# Patient Record
Sex: FEMALE | Race: WHITE | NOT HISPANIC OR LATINO | URBAN - METROPOLITAN AREA
[De-identification: names, ages, dates, MRNs, and addresses within clinical notes are randomized per-mention and may not be internally consistent; named-entity substitution may affect disease eponyms.]

---

## 2017-06-02 ENCOUNTER — OUTPATIENT (OUTPATIENT)
Dept: OUTPATIENT SERVICES | Facility: HOSPITAL | Age: 77
LOS: 1 days | End: 2017-06-02

## 2017-06-02 VITALS
TEMPERATURE: 98 F | DIASTOLIC BLOOD PRESSURE: 86 MMHG | HEIGHT: 66.5 IN | SYSTOLIC BLOOD PRESSURE: 140 MMHG | RESPIRATION RATE: 16 BRPM | WEIGHT: 173.06 LBS | HEART RATE: 84 BPM

## 2017-06-02 DIAGNOSIS — N81.4 UTEROVAGINAL PROLAPSE, UNSPECIFIED: ICD-10-CM

## 2017-06-02 DIAGNOSIS — N84.0 POLYP OF CORPUS UTERI: ICD-10-CM

## 2017-06-02 DIAGNOSIS — I10 ESSENTIAL (PRIMARY) HYPERTENSION: ICD-10-CM

## 2017-06-02 DIAGNOSIS — Z90.89 ACQUIRED ABSENCE OF OTHER ORGANS: Chronic | ICD-10-CM

## 2017-06-02 NOTE — H&P PST ADULT - FAMILY HISTORY
<<-----Click on this checkbox to enter Family History Family history of colon cancer     Mother  Still living? No  Family history of hypertension, Age at diagnosis: Age Unknown     Father  Still living? No  Family history of stroke, Age at diagnosis: Age Unknown

## 2017-06-02 NOTE — H&P PST ADULT - PROBLEM SELECTOR PLAN 1
scheduled d&c hysteroscopy, polypectomy on 6/12/17.  preop instructions, gi prophylaxis given  pt verbalized understanding  copy of EKG, cbc, bmp in chart from pcp

## 2017-06-02 NOTE — H&P PST ADULT - HISTORY OF PRESENT ILLNESS
76y/o female presents for preop eval for scheduled d&c hysteroscopy, polypectomy on 6/12/17.  Pt with c/o recent vaginal staining.  Pt with h/o uterine prolapse & HTN.

## 2017-06-02 NOTE — H&P PST ADULT - NEGATIVE CARDIOVASCULAR SYMPTOMS
no claudication/no dyspnea on exertion/no chest pain/no paroxysmal nocturnal dyspnea/no orthopnea/no palpitations/no peripheral edema

## 2017-06-11 ENCOUNTER — TRANSCRIPTION ENCOUNTER (OUTPATIENT)
Age: 77
End: 2017-06-11

## 2017-06-12 ENCOUNTER — OUTPATIENT (OUTPATIENT)
Dept: OUTPATIENT SERVICES | Facility: HOSPITAL | Age: 77
LOS: 1 days | Discharge: HOME | End: 2017-06-12
Payer: MEDICARE

## 2017-06-12 VITALS
SYSTOLIC BLOOD PRESSURE: 151 MMHG | RESPIRATION RATE: 14 BRPM | HEIGHT: 66.5 IN | DIASTOLIC BLOOD PRESSURE: 81 MMHG | WEIGHT: 169.98 LBS | HEART RATE: 90 BPM | OXYGEN SATURATION: 100 % | TEMPERATURE: 98 F

## 2017-06-12 VITALS
DIASTOLIC BLOOD PRESSURE: 72 MMHG | RESPIRATION RATE: 16 BRPM | HEART RATE: 74 BPM | SYSTOLIC BLOOD PRESSURE: 121 MMHG | OXYGEN SATURATION: 97 % | TEMPERATURE: 97 F

## 2017-06-12 DIAGNOSIS — Z90.89 ACQUIRED ABSENCE OF OTHER ORGANS: Chronic | ICD-10-CM

## 2017-06-12 DIAGNOSIS — N84.0 POLYP OF CORPUS UTERI: ICD-10-CM

## 2017-06-12 PROCEDURE — 88305 TISSUE EXAM BY PATHOLOGIST: CPT | Mod: 26

## 2017-06-12 RX ORDER — SODIUM CHLORIDE 9 MG/ML
1000 INJECTION, SOLUTION INTRAVENOUS
Qty: 0 | Refills: 0 | Status: DISCONTINUED | OUTPATIENT
Start: 2017-06-12 | End: 2017-06-13

## 2017-06-12 RX ORDER — CHOLECALCIFEROL (VITAMIN D3) 125 MCG
1 CAPSULE ORAL
Qty: 0 | Refills: 0 | COMMUNITY

## 2017-06-12 RX ORDER — ASCORBIC ACID 60 MG
1 TABLET,CHEWABLE ORAL
Qty: 0 | Refills: 0 | COMMUNITY

## 2017-06-12 RX ORDER — LOSARTAN POTASSIUM 100 MG/1
1 TABLET, FILM COATED ORAL
Qty: 0 | Refills: 0 | COMMUNITY

## 2017-06-12 NOTE — ASU DISCHARGE PLAN (ADULT/PEDIATRIC). - NURSING INSTRUCTIONS
You were given IV Tylenol for pain management.  Please DO NOT take tylenol for the next 8 hours (until _______ ).   You were given Toradol for pain management. Please DO Not take Motrin/Ibuprofen (NSAIDS) for the next 6 hours (Until _______). You were given IV Tylenol for pain management.  Please DO NOT take tylenol for the next 8 hours (until 600PM ).   You were given Toradol for pain management. Please DO Not take Motrin/Ibuprofen (NSAIDS) for the next 6 hours (Until 600PM).

## 2017-06-12 NOTE — ASU DISCHARGE PLAN (ADULT/PEDIATRIC). - NOTIFY
GYN Fever>100.4/Bleeding that does not stop Pain not relieved by Medications/GYN Fever>100.4/Bleeding that does not stop/Persistent Nausea and Vomiting

## 2017-06-12 NOTE — ASU DISCHARGE PLAN (ADULT/PEDIATRIC). - ACTIVITY LEVEL
nothing per vagina no tampons/Nothing in vagina, no intercourse, no douching, no tampons, no tub baths, and no swimming for about 2 weeks or until cleared by MD. Contact your doctor if you are soaking a pad every hour or experience foul smelling discharge until follow up with MD/nothing per vagina/no tub baths/no intercourse/no douching

## 2017-06-15 LAB — SURGICAL PATHOLOGY STUDY: SIGNIFICANT CHANGE UP

## 2018-07-23 PROBLEM — Z00.00 ENCOUNTER FOR PREVENTIVE HEALTH EXAMINATION: Status: ACTIVE | Noted: 2018-07-23

## 2018-08-22 ENCOUNTER — APPOINTMENT (OUTPATIENT)
Dept: OTOLARYNGOLOGY | Facility: CLINIC | Age: 78
End: 2018-08-22
Payer: MEDICARE

## 2018-08-22 VITALS — SYSTOLIC BLOOD PRESSURE: 184 MMHG | HEART RATE: 92 BPM | DIASTOLIC BLOOD PRESSURE: 102 MMHG

## 2018-08-22 DIAGNOSIS — Z78.9 OTHER SPECIFIED HEALTH STATUS: ICD-10-CM

## 2018-08-22 DIAGNOSIS — Z82.2 FAMILY HISTORY OF DEAFNESS AND HEARING LOSS: ICD-10-CM

## 2018-08-22 DIAGNOSIS — J34.89 OTHER SPECIFIED DISORDERS OF NOSE AND NASAL SINUSES: ICD-10-CM

## 2018-08-22 DIAGNOSIS — Z80.9 FAMILY HISTORY OF MALIGNANT NEOPLASM, UNSPECIFIED: ICD-10-CM

## 2018-08-22 DIAGNOSIS — R42 DIZZINESS AND GIDDINESS: ICD-10-CM

## 2018-08-22 PROCEDURE — 92567 TYMPANOMETRY: CPT

## 2018-08-22 PROCEDURE — 99214 OFFICE O/P EST MOD 30 MIN: CPT | Mod: 25

## 2018-08-22 PROCEDURE — 92557 COMPREHENSIVE HEARING TEST: CPT

## 2018-08-22 PROCEDURE — 69210 REMOVE IMPACTED EAR WAX UNI: CPT

## 2022-01-31 NOTE — H&P PST ADULT - PSH
[FreeTextEntry1] : Due to drainage pt received dressing change by LPN prior to tx. \par Pt descended to 2.0 RAIMUNDO @ 1.75 PSI/min without incident in chamber #.1\par Pt resting @ depth with chest rise and fall observed throughout tx. \par Pt ascended from 2.0 RAIMUNDO @ 1.75 PSI/min without incident. \par Pt tolerated tx well.\par Pt received Ace wrap by RN post tx. \par  History of tonsillectomy  age 5 & age 21

## 2023-01-10 NOTE — H&P PST ADULT - NSANTHGENDERRD_ENT_A_CORE
Pt's wife called to make Clinical (she asked for [facility]) aware pt will be going for his bloodwork on Saturday      Routed to Clinical No

## 2023-07-13 PROBLEM — I10 ESSENTIAL (PRIMARY) HYPERTENSION: Chronic | Status: ACTIVE | Noted: 2017-06-02

## 2023-07-13 PROBLEM — N81.4 UTEROVAGINAL PROLAPSE, UNSPECIFIED: Chronic | Status: ACTIVE | Noted: 2017-06-02

## 2023-07-13 PROBLEM — N84.0 POLYP OF CORPUS UTERI: Chronic | Status: ACTIVE | Noted: 2017-06-02

## 2023-07-24 ENCOUNTER — APPOINTMENT (OUTPATIENT)
Dept: WOUND CARE | Facility: CLINIC | Age: 83
End: 2023-07-24
Payer: MEDICARE

## 2023-07-24 VITALS
TEMPERATURE: 97.3 F | RESPIRATION RATE: 16 BRPM | SYSTOLIC BLOOD PRESSURE: 144 MMHG | DIASTOLIC BLOOD PRESSURE: 81 MMHG | HEART RATE: 68 BPM | OXYGEN SATURATION: 96 %

## 2023-07-24 DIAGNOSIS — I10 ESSENTIAL (PRIMARY) HYPERTENSION: ICD-10-CM

## 2023-07-24 DIAGNOSIS — R73.03 PREDIABETES.: ICD-10-CM

## 2023-07-24 DIAGNOSIS — G57.93 UNSPECIFIED MONONEUROPATHY OF BILATERAL LOWER LIMBS: ICD-10-CM

## 2023-07-24 PROCEDURE — 11042 DBRDMT SUBQ TIS 1ST 20SQCM/<: CPT

## 2023-07-24 PROCEDURE — 29581 APPL MULTLAYER CMPRN SYS LEG: CPT | Mod: 59,LT

## 2023-07-24 PROCEDURE — 99205 OFFICE O/P NEW HI 60 MIN: CPT | Mod: 25

## 2023-07-24 RX ORDER — TOBRAMYCIN AND DEXAMETHASONE 3; 1 MG/ML; MG/ML
0.3-0.1 SUSPENSION/ DROPS OPHTHALMIC
Qty: 5 | Refills: 0 | Status: DISCONTINUED | COMMUNITY
Start: 2023-03-07

## 2023-07-24 RX ORDER — METRONIDAZOLE 7.5 MG/G
0.75 GEL TOPICAL
Refills: 0 | Status: ACTIVE | COMMUNITY

## 2023-07-24 RX ORDER — CYANOCOBALAMIN (VITAMIN B-12) 1000 MCG
1000 TABLET ORAL
Refills: 0 | Status: ACTIVE | COMMUNITY

## 2023-07-24 RX ORDER — METOPROLOL TARTRATE 25 MG/1
25 TABLET, FILM COATED ORAL
Refills: 0 | Status: DISCONTINUED | COMMUNITY
End: 2023-07-24

## 2023-07-24 RX ORDER — CHROMIUM 200 MCG
TABLET ORAL
Refills: 0 | Status: DISCONTINUED | COMMUNITY
End: 2023-07-24

## 2023-07-24 RX ORDER — DULOXETINE HYDROCHLORIDE 20 MG/1
20 CAPSULE, DELAYED RELEASE PELLETS ORAL
Qty: 30 | Refills: 0 | Status: ACTIVE | COMMUNITY
Start: 2023-06-21

## 2023-07-24 RX ORDER — B-COMPLEX WITH VITAMIN C
1250 (500 CA) TABLET ORAL
Refills: 0 | Status: ACTIVE | COMMUNITY

## 2023-07-24 RX ORDER — LOSARTAN POTASSIUM 25 MG/1
25 TABLET, FILM COATED ORAL
Refills: 0 | Status: DISCONTINUED | COMMUNITY
End: 2023-07-24

## 2023-07-24 RX ORDER — METOPROLOL SUCCINATE 50 MG/1
50 TABLET, EXTENDED RELEASE ORAL
Qty: 90 | Refills: 0 | Status: ACTIVE | COMMUNITY
Start: 2023-06-06

## 2023-07-24 RX ORDER — FLUTICASONE PROPIONATE 50 UG/1
50 SPRAY, METERED NASAL DAILY
Qty: 1 | Refills: 5 | Status: DISCONTINUED | COMMUNITY
Start: 2018-08-22 | End: 2023-07-24

## 2023-07-24 RX ORDER — CHOLECALCIFEROL (VITAMIN D3) 50 MCG
TABLET ORAL
Refills: 0 | Status: ACTIVE | COMMUNITY

## 2023-07-24 RX ORDER — MAGNESIUM OXIDE 420 MG/1
420 TABLET ORAL
Refills: 0 | Status: ACTIVE | COMMUNITY

## 2023-07-24 RX ORDER — OLMESARTAN MEDOXOMIL AND HYDROCHLOROTHIAZIDE 40; 12.5 MG/1; MG/1
40-12.5 TABLET ORAL
Qty: 90 | Refills: 0 | Status: ACTIVE | COMMUNITY
Start: 2023-06-18

## 2023-07-24 RX ORDER — MUPIROCIN 20 MG/G
2 OINTMENT TOPICAL
Qty: 22 | Refills: 0 | Status: ACTIVE | COMMUNITY
Start: 2023-07-11

## 2023-07-24 RX ORDER — NICOTINE POLACRILEX 2 MG
1 GUM BUCCAL
Refills: 0 | Status: ACTIVE | COMMUNITY

## 2023-07-24 NOTE — PHYSICAL EXAM
[Normal Thyroid] : the thyroid was normal [Normal Breath Sounds] : Normal breath sounds [Normal Heart Sounds] : normal heart sounds [2+] : left 2+ [Ankle Swelling On The Left] : moderate [No HSM] : no hepatosplenomegaly [Skin Ulcer] : ulcer [Alert] : alert [Oriented to Person] : oriented to person [Oriented to Place] : oriented to place [Oriented to Time] : oriented to time [Calm] : calm [Please See PDF for Tissue Analytics] : Please See PDF for Tissue Analytics. [Abdomen Masses] : No abdominal massess [Abdomen Tenderness] : ~T ~M No abdominal tenderness [de-identified] : NAD, well groomed [de-identified] : non traumatic  [de-identified] : supple [de-identified] : equal chest rise [de-identified] : soft non tender

## 2023-07-24 NOTE — HISTORY OF PRESENT ILLNESS
[FreeTextEntry1] : Ms. LISBET HILL   presents to the office with a wound since 2022 biopsy taken. wound hasn’t healed since biopsy taken 1 year ago, biopsy was negative, patient follow up with dermatologist. The wound is located on  the left leg. The patient has complaints of non healing wound.  The patient has been dressing the wound with mupirocin.  The patient denies fevers or chills. The patient has localized pain to the wound upon dressing changes. The patient has no other complaints or associated symptoms. \par \par

## 2023-07-24 NOTE — ASSESSMENT
[FreeTextEntry1] : Wound Assessment and Plan:\par \par The patient presents with a wound to the left leg.  Swelling noted to the extremity.  \par EDILBERTO/PVR and standing venous reflux study scheduled.\par s/p excisional debridement\par No clinical sign of infection\par Recommendation:\par Apply lidocaine or topical anesthetic if needed to reduce pain upon washing the wound.\par Wash wound with ---- Dove skin sensitive soap and clean water \par Apply ---ben, gauze, multilayer dressing\par 3 times per week.\par Leg elevation as tolerated\par Encouraged ambulation or exercise.\par Optimization of nutrition.\par Offloading to the wound site.\par \par Compression aero wrap ordered with measurements\par patient instructed to put it on once receives in the mail\par \par Supplies ordered\par Return in 2 weeks\par

## 2023-07-31 RX ORDER — TRIAMCINOLONE ACETONIDE 1 MG/G
0.1 CREAM TOPICAL TWICE DAILY
Qty: 1 | Refills: 2 | Status: ACTIVE | COMMUNITY
Start: 2023-07-31 | End: 1900-01-01

## 2023-08-14 ENCOUNTER — APPOINTMENT (OUTPATIENT)
Dept: WOUND CARE | Facility: CLINIC | Age: 83
End: 2023-08-14
Payer: MEDICARE

## 2023-08-14 ENCOUNTER — OUTPATIENT (OUTPATIENT)
Dept: OUTPATIENT SERVICES | Facility: HOSPITAL | Age: 83
LOS: 1 days | End: 2023-08-14
Payer: MEDICARE

## 2023-08-14 VITALS — BODY MASS INDEX: 27.64 KG/M2 | WEIGHT: 174 LBS | HEIGHT: 66.5 IN

## 2023-08-14 DIAGNOSIS — Z90.89 ACQUIRED ABSENCE OF OTHER ORGANS: Chronic | ICD-10-CM

## 2023-08-14 PROCEDURE — 11042 DBRDMT SUBQ TIS 1ST 20SQCM/<: CPT

## 2023-08-17 NOTE — PLAN
[FreeTextEntry1] : 8/14/23 Plan Wash wound with soap and water Continue Josh Yang ACE. May also use medihoney specifically on the wounds - avoid the undamaged skin around the wound. Continue aero wraps - ok to remove at night. Elevation as tolerated vascular studies scheduled. Follow up in 3 weeks

## 2023-08-17 NOTE — HISTORY OF PRESENT ILLNESS
[FreeTextEntry1] : Ms. LISBET HILL   presents to the office with a wound since 2022 biopsy taken. wound hasn't healed since biopsy taken 1 year ago, biopsy was negative, patient follow up with dermatologist. The wound is located on  the left leg. The patient has complaints of non healing wound.  The patient has been dressing the wound with mupirocin.  The patient denies fevers or chills. The patient has localized pain to the wound upon dressing changes. The patient has no other complaints or associated symptoms. \par  \par

## 2023-08-17 NOTE — PHYSICAL EXAM
[2+] : left 2+ [Ankle Swelling On The Left] : moderate [No HSM] : no hepatosplenomegaly [Skin Ulcer] : ulcer [Alert] : alert [Oriented to Person] : oriented to person [Oriented to Place] : oriented to place [Oriented to Time] : oriented to time [Calm] : calm [Please See PDF for Tissue Analytics] : Please See PDF for Tissue Analytics. [Abdomen Masses] : No abdominal massess [Abdomen Tenderness] : ~T ~M No abdominal tenderness [de-identified] : NAD, well groomed [de-identified] : non traumatic  [de-identified] : supple [de-identified] : equal chest rise [de-identified] : soft non tender

## 2023-08-17 NOTE — ASSESSMENT
[FreeTextEntry1] : Wound Assessment and Plan:  The patient presents with a wound to the left leg.  Swelling noted to the extremity.   EDILBERTO/PVR and standing venous reflux study scheduled. s/p excisional debridement No clinical sign of infection Recommendation: Apply lidocaine or topical anesthetic if needed to reduce pain upon washing the wound. Wash wound with ---- Dove skin sensitive soap and clean water  Apply ---ben, gauze, multilayer dressing 3 times per week. Leg elevation as tolerated Encouraged ambulation or exercise. Optimization of nutrition. Offloading to the wound site.  Compression aero wrap ordered with measurements patient instructed to put it on once receives in the mail  Supplies ordered Return in 2 weeks  8/14/23 Patient here for follow up of her LLE wound She has been doing medihoney, gauze, and Josh. Has been cleaning the wound in the shower. She has been using the aero wrap, is only refilling maybe 1x per day. She does not yet have the aero wrap for the RLE. She is scheduled to see Dr. Becerra in vascular surgery later this month and will undergo vascular studies On exam: LLE wound from prior biopsy has small amount of slough. Some superficial opening of skin in the surrounding area. Excisional debridement performed. She does have obvious superficial varicosities on both lower legs and feet

## 2023-08-30 ENCOUNTER — APPOINTMENT (OUTPATIENT)
Dept: VASCULAR SURGERY | Facility: CLINIC | Age: 83
End: 2023-08-30
Payer: MEDICARE

## 2023-08-30 VITALS
BODY MASS INDEX: 28.11 KG/M2 | SYSTOLIC BLOOD PRESSURE: 128 MMHG | HEART RATE: 87 BPM | HEIGHT: 66.5 IN | DIASTOLIC BLOOD PRESSURE: 81 MMHG | WEIGHT: 177 LBS | TEMPERATURE: 98 F

## 2023-08-30 PROCEDURE — 93923 UPR/LXTR ART STDY 3+ LVLS: CPT

## 2023-08-30 PROCEDURE — 93970 EXTREMITY STUDY: CPT

## 2023-08-30 PROCEDURE — 99213 OFFICE O/P EST LOW 20 MIN: CPT

## 2023-08-30 NOTE — PHYSICAL EXAM
[Respiratory Effort] : normal respiratory effort [Normal Rate and Rhythm] : normal rate and rhythm [2+] : left 2+ [Ankle Swelling (On Exam)] : present [Ankle Swelling Bilaterally] : bilaterally  [Ankle Swelling On The Right] : mild [] : present [Ankle Swelling On The Left] : moderate [Varicose Veins Of Lower Extremities] : not present [de-identified] : NAD [de-identified] : Normal [de-identified] : L ankle lesion - healing and small now at 0.5 cm

## 2023-08-30 NOTE — HISTORY OF PRESENT ILLNESS
[FreeTextEntry1] : 83F with B/L swelling and left medial ankle wound followed by wound care concerning for venous ulcer presents for potential intervention. Left foot wound for 3-4 mos and slowly healing. However, B/L leg swelling consistent with VI. Improved with elevation and worse with prolonged standing. Has developed b/l leg skin changes. No rest pain or claudication. No arterial insufficiency. No fam hx of varicose veins. Not on OCPs. No previous interventions for veins.

## 2023-08-30 NOTE — ASSESSMENT
[FreeTextEntry1] : Ms. LISBET HILL is a 83 year with persistent and worsening bilateral  lower extremity venous insufficiency, CEAP classification C _6__which is  unresponsive to at least 3 months of compression stockings 20-30 mmHg, leg elevation, exercise and over the counter pain medication_(Ibuprofen).  Patient has complaints of nonhealing wounds.  The patients symptoms interfere with their ADLs, such as walking, shopping and house work, and their ability to work and exercise. Patient has intact pulses in both legs without evidence of arterial insufficiency.    Treatment is indicated not for cosmetic reasons but for symptomatic venous reflux disease with symptoms which is refractory to conservative therapy. Venous duplex study demonstrates bilateral lower extremity venous insufficiency. The need for definitive effective treatment is based on severe, interfering and worsening reflux symptoms with evidence of venous insufficiency on venous ultrasound.   Patient is a candidate for  Venoseal of bilateral GSVs.  The risks and benefits of Venoseal treatment versus continued conservative management were discussed with the patient.  Patient wants to think about it.

## 2023-08-31 ENCOUNTER — OUTPATIENT (OUTPATIENT)
Dept: OUTPATIENT SERVICES | Facility: HOSPITAL | Age: 83
LOS: 1 days | End: 2023-08-31
Payer: MEDICARE

## 2023-08-31 ENCOUNTER — APPOINTMENT (OUTPATIENT)
Dept: WOUND CARE | Facility: CLINIC | Age: 83
End: 2023-08-31
Payer: MEDICARE

## 2023-08-31 VITALS — TEMPERATURE: 97.3 F

## 2023-08-31 VITALS
DIASTOLIC BLOOD PRESSURE: 73 MMHG | OXYGEN SATURATION: 94 % | SYSTOLIC BLOOD PRESSURE: 113 MMHG | RESPIRATION RATE: 16 BRPM | HEART RATE: 90 BPM

## 2023-08-31 DIAGNOSIS — S81.802A UNSPECIFIED OPEN WOUND, LEFT LOWER LEG, INITIAL ENCOUNTER: ICD-10-CM

## 2023-08-31 DIAGNOSIS — Z90.89 ACQUIRED ABSENCE OF OTHER ORGANS: Chronic | ICD-10-CM

## 2023-08-31 PROCEDURE — 11042 DBRDMT SUBQ TIS 1ST 20SQCM/<: CPT

## 2023-09-01 PROBLEM — S81.802A LEG WOUND, LEFT: Status: ACTIVE | Noted: 2023-07-24

## 2023-09-01 NOTE — PLAN
[FreeTextEntry1] : 8/14/23 Plan Wash wound with soap and water Continue Aquacel, Josh, ACE. May also use medihoney specifically on the wounds - avoid the undamaged skin around the wound. Continue aero wraps - ok to remove at night. Elevation as tolerated vascular studies scheduled. Follow up in 3 weeks 8/31/23 Plan: Wash wound with soap and water. Apply Aquacel. Apply Aerowrap. Change dressing daily. Will order Lymphedema pump. Measurements taken. Elevation of the legs as tolerated. Follow up with Vascular for ablation procedure. [facility].

## 2023-09-01 NOTE — ASSESSMENT
[FreeTextEntry1] : Wound Assessment and Plan:  The patient presents with a wound to the left leg.  Swelling noted to the extremity.   EDILBERTO/PVR and standing venous reflux study scheduled. s/p excisional debridement No clinical sign of infection Recommendation: Apply lidocaine or topical anesthetic if needed to reduce pain upon washing the wound. Wash wound with ---- Dove skin sensitive soap and clean water  Apply ---ben, gauze, multilayer dressing 3 times per week. Leg elevation as tolerated Encouraged ambulation or exercise. Optimization of nutrition. Offloading to the wound site.  Compression aero wrap ordered with measurements patient instructed to put it on once receives in the mail  Supplies ordered Return in 2 weeks  8/14/23 Patient here for follow up of her LLE wound She has been doing medihoney, gauze, and Josh. Has been cleaning the wound in the shower. She has been using the aero wrap, is only refilling maybe 1x per day. She does not yet have the aero wrap for the RLE. She is scheduled to see Dr. Becerra in vascular surgery later this month and will undergo vascular studies On exam: LLE wound from prior biopsy has small amount of slough. Some superficial opening of skin in the surrounding area. Excisional debridement performed. She does have obvious superficial varicosities on both lower legs and feet  8/31/23 Patient presents for follow up of LLE wound. Patient is dressing her wound with Medi Honey and using an Aero Wrap. Patient denies pain. Patient has complaint of itch rash on the lower extremities and arms. On exam: Wound with moderate serous drainage. Wound bed is red with yellow slough. s/p excisional debridement Wound washed with soap and water Vascular studies reviewed with patient and daughter on the phone. Discussed using a lymphedema pump. Applied Aquacel to the wound. Apply Aerowarp Change dressing daily Vascular testing - severe superficial insufficiency, left side - severe deep and superficial venous insufficiency, candidate on left side for venous ablation skin changes-  hyperpigmentation, more pronounced on left, leg swelling despite use of compression, discussed importance of lymphedema pump, especially needed due to deep system involovement

## 2023-09-01 NOTE — PHYSICAL EXAM
[2+] : left 2+ [Ankle Swelling On The Left] : moderate [No HSM] : no hepatosplenomegaly [Skin Ulcer] : ulcer [Alert] : alert [Oriented to Person] : oriented to person [Oriented to Place] : oriented to place [Oriented to Time] : oriented to time [Calm] : calm [Please See PDF for Tissue Analytics] : Please See PDF for Tissue Analytics. [Abdomen Masses] : No abdominal massess [Abdomen Tenderness] : ~T ~M No abdominal tenderness [de-identified] : NAD, well groomed [de-identified] : non traumatic  [de-identified] : supple [de-identified] : equal chest rise [de-identified] : soft non tender

## 2023-09-08 DIAGNOSIS — S81.802A UNSPECIFIED OPEN WOUND, LEFT LOWER LEG, INITIAL ENCOUNTER: ICD-10-CM

## 2023-09-18 DIAGNOSIS — S81.802A UNSPECIFIED OPEN WOUND, LEFT LOWER LEG, INITIAL ENCOUNTER: ICD-10-CM

## 2023-09-18 DIAGNOSIS — I87.2 VENOUS INSUFFICIENCY (CHRONIC) (PERIPHERAL): ICD-10-CM

## 2023-09-18 DIAGNOSIS — I89.0 LYMPHEDEMA, NOT ELSEWHERE CLASSIFIED: ICD-10-CM

## 2023-09-21 ENCOUNTER — APPOINTMENT (OUTPATIENT)
Dept: WOUND CARE | Facility: HOSPITAL | Age: 83
End: 2023-09-21
Payer: MEDICARE

## 2023-09-21 VITALS
OXYGEN SATURATION: 95 % | HEART RATE: 89 BPM | DIASTOLIC BLOOD PRESSURE: 55 MMHG | BODY MASS INDEX: 27.64 KG/M2 | TEMPERATURE: 97.7 F | HEIGHT: 66.5 IN | WEIGHT: 174 LBS | SYSTOLIC BLOOD PRESSURE: 102 MMHG

## 2023-09-21 DIAGNOSIS — I87.312 CHRONIC VENOUS HYPERTENSION (IDIOPATHIC) WITH ULCER OF LEFT LOWER EXTREMITY: ICD-10-CM

## 2023-09-21 DIAGNOSIS — L97.929 CHRONIC VENOUS HYPERTENSION (IDIOPATHIC) WITH ULCER OF LEFT LOWER EXTREMITY: ICD-10-CM

## 2023-09-21 PROCEDURE — 11042 DBRDMT SUBQ TIS 1ST 20SQCM/<: CPT

## 2023-09-22 PROBLEM — I87.312 CHRONIC VENOUS HYPERTENSION (IDIOPATHIC) WITH ULCER OF LEFT LOWER EXTREMITY: Status: ACTIVE | Noted: 2023-09-01

## 2023-10-17 ENCOUNTER — APPOINTMENT (OUTPATIENT)
Dept: WOUND CARE | Facility: HOSPITAL | Age: 83
End: 2023-10-17
Payer: MEDICARE

## 2023-10-17 DIAGNOSIS — Z87.828 PERSONAL HISTORY OF OTHER (HEALED) PHYSICAL INJURY AND TRAUMA: ICD-10-CM

## 2023-10-17 DIAGNOSIS — I87.2 VENOUS INSUFFICIENCY (CHRONIC) (PERIPHERAL): ICD-10-CM

## 2023-10-17 DIAGNOSIS — I89.0 LYMPHEDEMA, NOT ELSEWHERE CLASSIFIED: ICD-10-CM

## 2023-10-17 PROCEDURE — 99214 OFFICE O/P EST MOD 30 MIN: CPT

## 2023-10-17 RX ORDER — AMMONIUM LACTATE 12 %
12 CREAM (GRAM) TOPICAL TWICE DAILY
Qty: 1 | Refills: 2 | Status: ACTIVE | COMMUNITY
Start: 2023-10-17 | End: 1900-01-01

## 2023-10-20 PROBLEM — I87.2 VENOUS INSUFFICIENCY OF BOTH LOWER EXTREMITIES: Status: ACTIVE | Noted: 2023-09-01

## 2023-10-20 PROBLEM — I89.0 LYMPHEDEMA OF BOTH LOWER EXTREMITIES: Status: ACTIVE | Noted: 2023-09-01

## 2023-10-20 PROBLEM — Z87.828 HEALED WOUND: Status: ACTIVE | Noted: 2023-10-20

## 2024-03-12 ENCOUNTER — OFFICE (OUTPATIENT)
Dept: URBAN - METROPOLITAN AREA CLINIC 35 | Facility: CLINIC | Age: 84
Setting detail: OPHTHALMOLOGY
End: 2024-03-12
Payer: MEDICARE

## 2024-03-12 DIAGNOSIS — H01.001: ICD-10-CM

## 2024-03-12 DIAGNOSIS — H52.4: ICD-10-CM

## 2024-03-12 DIAGNOSIS — Z96.1: ICD-10-CM

## 2024-03-12 DIAGNOSIS — H43.813: ICD-10-CM

## 2024-03-12 DIAGNOSIS — H01.004: ICD-10-CM

## 2024-03-12 DIAGNOSIS — H18.413: ICD-10-CM

## 2024-03-12 DIAGNOSIS — H11.153: ICD-10-CM

## 2024-03-12 DIAGNOSIS — H52.7: ICD-10-CM

## 2024-03-12 PROCEDURE — 92015 DETERMINE REFRACTIVE STATE: CPT | Performed by: OPHTHALMOLOGY

## 2024-03-12 PROCEDURE — 92012 INTRM OPH EXAM EST PATIENT: CPT | Performed by: OPHTHALMOLOGY

## 2024-03-12 ASSESSMENT — REFRACTION_MANIFEST
OS_AXIS: 160
OS_ADD: +2.00
OS_SPHERE: -0.75
OS_VA1: 20/20
OD_ADD: +2.00
OD_CYLINDER: +1.75
OD_AXIS: 005
OS_CYLINDER: +1.00
OD_CYLINDER: +1.25
OS_AXIS: 140
OD_AXIS: 180
OD_CYLINDER: +1.25
OD_VA1: 20/20
OS_AXIS: 140
OD_SPHERE: -1.25
OS_SPHERE: -0.50
OD_VA1: 20/20
OS_VA1: 20/20
OS_CYLINDER: +0.75
OD_ADD: +2.00
OS_SPHERE: -0.75
OD_ADD: +2.00
OS_ADD: +2.00
OD_VA1: 20/20
OD_SPHERE: -1.25
OD_AXIS: 180
OS_CYLINDER: +0.50
OS_ADD: +2.00
OD_SPHERE: -1.50

## 2024-03-12 ASSESSMENT — REFRACTION_CURRENTRX
OS_CYLINDER: +0.75
OS_OVR_VA: 20/
OD_CYLINDER: -1.25
OD_VPRISM_DIRECTION: BF
OD_AXIS: 090
OS_SPHERE: -0.75
OS_AXIS: 024
OD_OVR_VA: 20/
OD_AXIS: 005
OD_ADD: +2.00
OS_VPRISM_DIRECTION: BF
OD_ADD: +2.25
OD_SPHERE: -1.25
OD_VPRISM_DIRECTION: BF
OS_CYLINDER: -1.00
OS_ADD: +2.00
OS_VPRISM_DIRECTION: BF
OS_SPHERE: -0.25
OD_OVR_VA: 20/
OD_SPHERE: PLANO
OS_ADD: +2.25
OS_OVR_VA: 20/
OS_AXIS: 145
OD_CYLINDER: +1.25

## 2024-03-12 ASSESSMENT — SPHEQUIV_DERIVED
OS_SPHEQUIV: -0.375
OS_SPHEQUIV: -0.25
OD_SPHEQUIV: -0.625
OS_SPHEQUIV: -0.25
OD_SPHEQUIV: -0.625
OD_SPHEQUIV: -0.625

## 2024-03-12 ASSESSMENT — LID EXAM ASSESSMENTS
OS_BLEPHARITIS: LUL 1+
OS_COMMENTS: MISSING MEIBOMIAN GLAND
OD_BLEPHARITIS: RUL T

## 2024-03-12 ASSESSMENT — LID POSITION - PTOSIS
OD_PTOSIS: RUL
OS_PTOSIS: LUL

## 2024-06-25 NOTE — ASU PREOP CHECKLIST - SPO2 (%)
[facility]  Cardiac Electrophysiology  [address]  [place] [address]   Office: [phone]  Fax: [phone]     Cardiac Electrophysiology Consultation    Patient: Loni Bourgeois   : 1939     Referring Provider: Self  Primary Care Provider: Zoë Bennett APRN CNP    CHIEF COMPLAINT/REASON FOR CONSULTATION  Paroxysmal atrial fibrillation    Assessment/Recommendations     Paroxysmal atrial fibrillation: Chronic problem for the patient who previously underwent cryoablation in .  Per the patient's report she had a cryo ablation at that time and has had no sustained recurrence of tachypalpitations.  She remains on DOAC (apixaban) therapy with a [id number] score of 4.  She has some gait instability and had a fall with injury in .  We discussed the potential for left atrial appendage closure (LAAC) and she was provided further information regarding this treatment option.    Hypertrophic cardiomyopathy: Chronic diagnosis for the patient who reports that she has never been hospitalized with heart failure and is not on any chronic diuretic therapy.  The patient had a echocardiogram in January ([place], [place]) but I do not have a copy of that study.  Clinically the patient is euvolemic today.    Essential hypertension: Chronic problem for the patient and her blood pressure is at target on her current medications.    Follow up/recommendations:   No change in her current medications.  The patient will be scheduled for a visit to establish care with general cardiology for long-term management in approximately 6 months.  Further discussion regarding LAAC can be done at that time.    Time spent: 60 minutes spent on the date of the encounter doing chart review, history and exam, documentation and further activities as noted above.       History of Present Illness   Loni Bourgeois is a 84 year old female with hypertrophic cardiomyopathy, paroxysmal atrial  "fibrillation, hypertension referred by Zoë Bennett CNP for consultation regarding cardiology follow-up.  The patient has a history of hypertrophic cardiomyopathy and paroxysmal atrial fibrillation.  She underwent pulmonary vein isolation in 2019 at the [facility] in [place].  She reports that she has done well following the procedure with no recurrent tachypalpitations or any subsequent documentation of atrial arrhythmias.  The patient reports no exertional dyspnea or chest pain.  She is not experiencing any orthopnea, PND or ankle edema.  She did take a fall a year ago and injured her right lower leg with a large hematoma.  This required her to go off her apixaban anticoagulant therapy for a period of several days.  She does have some gait instability but no recent falls.       Physical Examination  Review of Systems   VITALS: /66 (BP Location: Right arm, Patient Position: Sitting, Cuff Size: Adult Regular)   Pulse 64   Resp 14   Ht 1.588 m (5' 2.5\")   Wt 69.9 kg (154 lb)   BMI 27.72 kg/m      Wt Readings from Last 3 Encounters:   No data found for Wt     CONSTITUTIONAL: well nourished, comfortable, no distress  EYES:  Conjunctivae pink, sclerae clear.    E/N/T:  Oral mucosa pink  RESPIRATORY:  Respiratory effort is normal  CARDIOVASCULAR: Regular rate and rhythm with normal S1 and S2.  GASTROINTESTINAL:  Abdomen without masses or tenderness  EXTREMITIES:  No clubbing or cyanosis.    MUSCULOSKELETAL:  Overall grossly normal muscle strength  SKIN:  Overall, skin warm and dry, no lesions.  NEURO/PSYCH:  Oriented x 3 with normal affect.   Constitutional:  No weight loss or loss of appetite    Eyes:  No difficulty with vision, no double vision, no dry eyes  ENT:  No sore throat, difficulty swallowing; changes in hearing or tinnitus  Cardiovascular: As detailed above  Respiratory:  No cough  Musculoskeletal  No joint pain, muscle aches  Neurologic:  No syncope, lightheadedness, fainting spells " "  Hematologic: No easy bruising, excessive bleeding tendency   Gastrointestinal:  No jaundice, abdominal pain or abdominal bloating  Genitourinary: No changes in urinary habits, no trouble urinating    Psychiatric: No anxiety or depression      Medical History  Surgical History   Past Medical History:   Diagnosis Date    Essential hypertension     Hyperlipidemia LDL goal <100     Hypertrophic cardiomyopathy (H)     Malignant neoplasm of upper lobe of left lung (H)     Status post wedge resection    Paroxysmal atrial fibrillation (H)     Personal history of malignant neoplasm of breast     Status post bilateral mastectomy    Past Surgical History:   Procedure Laterality Date    EP ABLATION PULMONARY VEIN ISOLATION  2019    Cryo balloon procedure 5/16/2019    LUNG CANCER SURGERY  2010    Left upper lobe wedge resection    MASTECTOMY SIMPLE BILATERAL  1990    REPLACEMENT TOTAL KNEE           Family History Social History   No family history on file.            Medications  Allergies   No current outpatient medications on file.   Not on File       Lab Results    Chemistry CBC Cardiac Enzymes/BNP/TSH/INR   Recent Labs   Lab Test 05/01/24  0918      POTASSIUM 4.9   CHLORIDE 103   CO2 33*   GLC 98   BUN 24.8*   CR 0.82   GFRESTIMATED 70   LOPEZ 9.7     Recent Labs   Lab Test 05/01/24  0918   CR 0.82          No results for input(s): \"WBC\", \"HGB\", \"HCT\", \"MCV\", \"PLT\" in the last 13075 hours.  No results for input(s): \"HGB\" in the last 18811 hours. No results for input(s): \"TROPONINI\" in the last 60145 hours.  No results for input(s): \"BNP\", \"NTBNPI\", \"NTBNP\" in the last 81935 hours.  Recent Labs   Lab Test 05/01/24  0918   TSH 0.22*     No results for input(s): \"INR\" in the last 87085 hours.      Data Review    ECGs will be obtained.  (tracings independently reviewed)      Echocardiogram (January tracing will be obtained)       Cc:       " 100

## 2024-11-19 ENCOUNTER — DOCTOR'S OFFICE (OUTPATIENT)
Facility: LOCATION | Age: 84
Setting detail: OPHTHALMOLOGY
End: 2024-11-19
Payer: MEDICARE

## 2024-11-19 DIAGNOSIS — H52.7: ICD-10-CM

## 2024-11-19 DIAGNOSIS — S05.02XA: ICD-10-CM

## 2024-11-19 PROCEDURE — 99213 OFFICE O/P EST LOW 20 MIN: CPT | Performed by: OPHTHALMOLOGY

## 2024-11-19 ASSESSMENT — REFRACTION_MANIFEST
OD_SPHERE: -1.25
OD_VA1: 20/20
OS_ADD: +2.00
OS_AXIS: 140
OD_ADD: +2.00
OD_VA1: 20/20
OD_VA1: 20/20
OD_SPHERE: -1.25
OS_CYLINDER: +0.50
OS_SPHERE: -0.50
OS_VA1: 20/20
OS_AXIS: 160
OD_SPHERE: -1.50
OD_ADD: +2.00
OD_AXIS: 005
OD_AXIS: 180
OS_VA1: 20/20
OD_CYLINDER: +1.25
OS_SPHERE: -0.75
OS_SPHERE: -0.75
OS_ADD: +2.00
OS_CYLINDER: +1.00
OD_CYLINDER: +1.25
OD_AXIS: 180
OS_CYLINDER: +0.75
OD_CYLINDER: +1.75
OD_ADD: +2.00
OS_AXIS: 140
OS_ADD: +2.00

## 2024-11-19 ASSESSMENT — KERATOMETRY
OS_K1POWER_DIOPTERS: 42.25
OD_K2POWER_DIOPTERS: 43.25
METHOD_AUTO_MANUAL: AUTO
OS_AXISANGLE_DEGREES: 103
OD_K1POWER_DIOPTERS: 41.75
OD_AXISANGLE_DEGREES: 180
OS_K2POWER_DIOPTERS: 43.50

## 2024-11-19 ASSESSMENT — REFRACTION_AUTOREFRACTION
OD_CYLINDER: +2.25
OD_AXIS: 007
OS_AXIS: 110
OS_CYLINDER: +2.00
OS_SPHERE: -2.00
OD_SPHERE: -1.50

## 2024-11-19 ASSESSMENT — CONFRONTATIONAL VISUAL FIELD TEST (CVF)
OD_FINDINGS: FULL
OS_FINDINGS: FULL

## 2024-11-19 ASSESSMENT — REFRACTION_CURRENTRX
OS_CYLINDER: +0.75
OD_SPHERE: -1.25
OS_CYLINDER: +0.75
OS_AXIS: 145
OD_SPHERE: -1.25
OS_OVR_VA: 20/
OD_VPRISM_DIRECTION: BF
OS_AXIS: 143
OS_SPHERE: -0.25
OD_OVR_VA: 20/
OS_VPRISM_DIRECTION: BF
OS_ADD: +2.25
OD_VPRISM_DIRECTION: BF
OS_CYLINDER: -1.00
OS_ADD: +2.00
OD_CYLINDER: -1.25
OD_AXIS: 008
OD_ADD: +2.25
OS_SPHERE: -0.75
OD_AXIS: 090
OD_ADD: +2.00
OS_ADD: +2.25
OD_AXIS: 005
OS_VPRISM_DIRECTION: BF
OD_ADD: +2.25
OD_SPHERE: PLANO
OD_CYLINDER: +1.25
OD_OVR_VA: 20/
OS_OVR_VA: 20/
OD_CYLINDER: +1.25
OS_OVR_VA: 20/
OS_AXIS: 024
OS_VPRISM_DIRECTION: BF
OD_VPRISM_DIRECTION: BF
OS_SPHERE: -0.75
OD_OVR_VA: 20/

## 2024-11-19 ASSESSMENT — VISUAL ACUITY
OD_BCVA: 20/25
OS_BCVA: 20/25-2

## 2024-11-25 ENCOUNTER — DOCTOR'S OFFICE (OUTPATIENT)
Facility: LOCATION | Age: 84
Setting detail: OPHTHALMOLOGY
End: 2024-11-25
Payer: MEDICARE

## 2024-11-25 DIAGNOSIS — S05.02XD: ICD-10-CM

## 2024-11-25 DIAGNOSIS — H02.884: ICD-10-CM

## 2024-11-25 PROCEDURE — 92012 INTRM OPH EXAM EST PATIENT: CPT | Performed by: OPHTHALMOLOGY

## 2024-11-25 ASSESSMENT — REFRACTION_CURRENTRX
OD_CYLINDER: -1.25
OS_ADD: +2.25
OS_AXIS: 024
OD_SPHERE: PLANO
OS_AXIS: 145
OS_ADD: +2.25
OS_ADD: +2.00
OD_SPHERE: -1.25
OS_VPRISM_DIRECTION: BF
OS_SPHERE: -0.75
OS_VPRISM_DIRECTION: BF
OS_VPRISM_DIRECTION: BF
OD_OVR_VA: 20/
OS_SPHERE: -0.25
OS_CYLINDER: +0.75
OD_VPRISM_DIRECTION: BF
OS_CYLINDER: +0.75
OD_OVR_VA: 20/
OS_CYLINDER: -1.00
OD_OVR_VA: 20/
OD_CYLINDER: +1.25
OD_VPRISM_DIRECTION: BF
OS_OVR_VA: 20/
OD_ADD: +2.00
OS_OVR_VA: 20/
OD_VPRISM_DIRECTION: BF
OS_SPHERE: -0.75
OD_CYLINDER: +1.25
OD_ADD: +2.25
OD_SPHERE: -1.25
OD_ADD: +2.25
OD_AXIS: 008
OD_AXIS: 005
OS_OVR_VA: 20/
OS_AXIS: 143
OD_AXIS: 090

## 2024-11-25 ASSESSMENT — REFRACTION_AUTOREFRACTION
OS_AXIS: 125
OD_CYLINDER: +2.25
OS_SPHERE: -1.25
OS_CYLINDER: +1.00
OD_SPHERE: -1.50
OD_AXIS: 007

## 2024-11-25 ASSESSMENT — REFRACTION_MANIFEST
OD_VA1: 20/20
OD_VA1: 20/20
OD_CYLINDER: +1.25
OS_CYLINDER: +1.00
OD_ADD: +2.00
OS_ADD: +2.00
OS_AXIS: 160
OD_ADD: +2.00
OS_AXIS: 140
OD_AXIS: 180
OD_CYLINDER: +1.75
OS_SPHERE: -0.75
OS_VA1: 20/20
OD_SPHERE: -1.25
OS_AXIS: 140
OS_ADD: +2.00
OS_VA1: 20/20
OS_CYLINDER: +0.50
OD_CYLINDER: +1.25
OS_SPHERE: -0.50
OD_VA1: 20/20
OS_SPHERE: -0.75
OD_AXIS: 005
OD_AXIS: 180
OS_ADD: +2.00
OS_CYLINDER: +0.75
OD_ADD: +2.00
OD_SPHERE: -1.25
OD_SPHERE: -1.50

## 2024-11-25 ASSESSMENT — LID POSITION - PTOSIS
OD_PTOSIS: RUL
OS_PTOSIS: LUL

## 2024-11-25 ASSESSMENT — KERATOMETRY
OS_AXISANGLE_DEGREES: 092
METHOD_AUTO_MANUAL: AUTO
OD_K2POWER_DIOPTERS: 43.75
OS_K1POWER_DIOPTERS: 42.25
OD_AXISANGLE_DEGREES: 175
OS_K2POWER_DIOPTERS: 43.25
OD_K1POWER_DIOPTERS: 42.50

## 2024-11-25 ASSESSMENT — LID EXAM ASSESSMENTS
OD_BLEPHARITIS: RUL T
OS_MEIBOMITIS: LUL
OS_COMMENTS: MISSING MEIBOMIAN GLAND
OS_BLEPHARITIS: LUL 1+

## 2024-11-25 ASSESSMENT — VISUAL ACUITY
OS_BCVA: 20/25-1
OD_BCVA: 20/20-1

## 2024-11-25 ASSESSMENT — CONFRONTATIONAL VISUAL FIELD TEST (CVF)
OS_FINDINGS: FULL
OD_FINDINGS: FULL